# Patient Record
Sex: MALE | Race: AMERICAN INDIAN OR ALASKA NATIVE
[De-identification: names, ages, dates, MRNs, and addresses within clinical notes are randomized per-mention and may not be internally consistent; named-entity substitution may affect disease eponyms.]

---

## 2019-03-17 ENCOUNTER — HOSPITAL ENCOUNTER (EMERGENCY)
Dept: HOSPITAL 5 - ED | Age: 59
LOS: 1 days | Discharge: LEFT BEFORE DISCHARGE | End: 2019-03-18
Payer: MEDICAID

## 2019-03-17 DIAGNOSIS — J93.9: ICD-10-CM

## 2019-03-17 DIAGNOSIS — I10: ICD-10-CM

## 2019-03-17 DIAGNOSIS — F17.200: ICD-10-CM

## 2019-03-17 DIAGNOSIS — J96.01: Primary | ICD-10-CM

## 2019-03-17 DIAGNOSIS — J44.1: ICD-10-CM

## 2019-03-17 DIAGNOSIS — F12.10: ICD-10-CM

## 2019-03-17 PROCEDURE — 71275 CT ANGIOGRAPHY CHEST: CPT

## 2019-03-17 PROCEDURE — 80048 BASIC METABOLIC PNL TOTAL CA: CPT

## 2019-03-17 PROCEDURE — 84484 ASSAY OF TROPONIN QUANT: CPT

## 2019-03-17 PROCEDURE — 82140 ASSAY OF AMMONIA: CPT

## 2019-03-17 PROCEDURE — 96374 THER/PROPH/DIAG INJ IV PUSH: CPT

## 2019-03-17 PROCEDURE — 96375 TX/PRO/DX INJ NEW DRUG ADDON: CPT

## 2019-03-17 PROCEDURE — 36415 COLL VENOUS BLD VENIPUNCTURE: CPT

## 2019-03-17 PROCEDURE — 85025 COMPLETE CBC W/AUTO DIFF WBC: CPT

## 2019-03-17 PROCEDURE — 71045 X-RAY EXAM CHEST 1 VIEW: CPT

## 2019-03-17 PROCEDURE — 96376 TX/PRO/DX INJ SAME DRUG ADON: CPT

## 2019-03-17 PROCEDURE — 99291 CRITICAL CARE FIRST HOUR: CPT
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
## 2019-03-18 VITALS — DIASTOLIC BLOOD PRESSURE: 84 MMHG | SYSTOLIC BLOOD PRESSURE: 135 MMHG

## 2019-03-18 LAB
BASOPHILS # (AUTO): 0 K/MM3 (ref 0–0.1)
BASOPHILS NFR BLD AUTO: 0.2 % (ref 0–1.8)
BUN SERPL-MCNC: 19 MG/DL (ref 9–20)
BUN/CREAT SERPL: 16 %
CALCIUM SERPL-MCNC: 9.8 MG/DL (ref 8.4–10.2)
EOSINOPHIL # BLD AUTO: 0.1 K/MM3 (ref 0–0.4)
EOSINOPHIL NFR BLD AUTO: 0.5 % (ref 0–4.3)
HCT VFR BLD CALC: 54.1 % (ref 35.5–45.6)
HEMOLYSIS INDEX: 24
HGB BLD-MCNC: 18.4 GM/DL (ref 11.8–15.2)
LYMPHOCYTES # BLD AUTO: 1.2 K/MM3 (ref 1.2–5.4)
LYMPHOCYTES NFR BLD AUTO: 10.2 % (ref 13.4–35)
MCHC RBC AUTO-ENTMCNC: 34 % (ref 32–34)
MCV RBC AUTO: 99 FL (ref 84–94)
MONOCYTES # (AUTO): 0.6 K/MM3 (ref 0–0.8)
MONOCYTES % (AUTO): 4.7 % (ref 0–7.3)
PLATELET # BLD: 281 K/MM3 (ref 140–440)
RBC # BLD AUTO: 5.49 M/MM3 (ref 3.65–5.03)

## 2019-03-18 NOTE — CAT SCAN REPORT
PROCEDURE: CT ANGIO CHEST 

 

TECHNIQUE:  CTA of the chest obtained with intravenous contrast. 

 

HISTORY: rib fractures hypoxia 

 

COMPARISONS: None  

 

FINDINGS: 

 

No evidence for acute pulmonary embolus. 

 

Heart is mildly enlarged. Coronary calcifications noted. Great vessels are normal in caliber 

 

Moderate to severe bullous and emphysematous changes seen in the upper to midlung zones. There is a s
mall pneumothorax seen in the anterior left lower lobe pleural space involving approximately 10% lung
 volume. Close continued follow-up recommended. 

 

Partially visualized upper abdomen is unremarkable.. 

 

IMPRESSION:  

 

small pneumothorax seen in the anterior left lower lobe pleural space involving approximately 10% bob
g volume. Close continued follow-up recommended.. 

 

Moderate to severe bullous and emphysematous changes seen in the upper to midlung zones 

 

Mild cardiomegaly. Coronary calcifications. 

 

This document is electronically signed by Colt Da Silva MD., March 18 2019 02:49:37 AM ET

## 2019-03-18 NOTE — XRAY REPORT
PROCEDURE: XR CHEST 1V AP 

 

TECHNIQUE:  A portable semiupright of the chest was obtained. 

 

HISTORY: dyspnea 

 

COMPARISONS: None  

 

FINDINGS: 

 

The heart size and vascularity appear normal. There are no acute infiltrates or effusions. The skelet
al structures do not show any acute changes. 

 

IMPRESSION:  

 

No acute cardiopulmonary process.. 

 

This document is electronically signed by Michael Verdin MD., March 18 2019 01:03:22 AM ET